# Patient Record
Sex: MALE | Race: WHITE
[De-identification: names, ages, dates, MRNs, and addresses within clinical notes are randomized per-mention and may not be internally consistent; named-entity substitution may affect disease eponyms.]

---

## 2019-09-24 ENCOUNTER — HOSPITAL ENCOUNTER (EMERGENCY)
Dept: HOSPITAL 25 - UCCORT | Age: 64
Discharge: HOME | End: 2019-09-24
Payer: COMMERCIAL

## 2019-09-24 VITALS — DIASTOLIC BLOOD PRESSURE: 62 MMHG | SYSTOLIC BLOOD PRESSURE: 149 MMHG

## 2019-09-24 DIAGNOSIS — Z79.84: ICD-10-CM

## 2019-09-24 DIAGNOSIS — H65.92: Primary | ICD-10-CM

## 2019-09-24 DIAGNOSIS — N40.0: ICD-10-CM

## 2019-09-24 DIAGNOSIS — J02.9: ICD-10-CM

## 2019-09-24 DIAGNOSIS — Z87.891: ICD-10-CM

## 2019-09-24 DIAGNOSIS — I10: ICD-10-CM

## 2019-09-24 DIAGNOSIS — E78.5: ICD-10-CM

## 2019-09-24 DIAGNOSIS — E11.9: ICD-10-CM

## 2019-09-24 PROCEDURE — 99202 OFFICE O/P NEW SF 15 MIN: CPT

## 2019-09-24 PROCEDURE — G0463 HOSPITAL OUTPT CLINIC VISIT: HCPCS

## 2019-09-24 NOTE — UC
Throat Pain/Nasal Arthur HPI





- HPI Summary


HPI Summary: 


64-year-old male comes in with a chief complaint of left-sided throat pain and 

left ear pain.  Patient reports she's had weeks of upper respiratory tract 

infection symptoms and was treated with amoxicillin and got rid of most of the 

symptoms except for the left ear pain in the left throat pain.  Is grossly been 

increasing since the treatment with amoxicillin which is finished over a week 

ago.  No difficulty breathing or swallowing.  Patient does have dentures in and 

he did remove them for about a day or 2 and did not feel there was any 

improvement.





- History of Current Complaint


Chief Complaint: UCRespiratory


Stated Complaint: ST, EAR PAIN


Time Seen by Provider: 09/24/19 16:36


Pain Intensity: 2





- Allergies/Home Medications


Allergies/Adverse Reactions: 


 Allergies











Allergy/AdvReac Type Severity Reaction Status Date / Time


 


No Known Allergies Allergy   Verified 09/24/19 16:37











Home Medications: 


 Home Medications





Aspirin EC TAB* [Ecotrin EC Low Dose 81 MG*] 1 tab DAILY 09/24/19 [History 

Confirmed 09/24/19]


Cholecalciferol (Vitamin D3) [Vitamin D3] 1 tab DAILY 09/24/19 [History 

Confirmed 09/24/19]


Finasteride TAB* [Proscar TAB*] 1 tab DAILY 09/24/19 [History Confirmed 09/24/19

]


Glucosamine Sulfate Dipot Chlr [Glucosamine] 1,500 mg QAM 09/24/19 [History 

Confirmed 09/24/19]


Lisinopril TAB* [Prinivil TAB 10 MG*] 80 mg QAM 09/24/19 [History Confirmed 09/ 24/19]


Pantoprazole TAB * [Protonix TAB*] 1 tab DAILY 09/24/19 [History Confirmed 09/24 /19]


Potassium Citrate [Urocit-K] 30 mg BID 09/24/19 [History Confirmed 09/24/19]


Simvastatin [Zocor] 20 mg QPM 09/24/19 [History Confirmed 09/24/19]


Tamsulosin CAP* [Flomax CAP*] 1 tab DAILY 09/24/19 [History Confirmed 09/24/19]


glipiZIDE TAB* [Glucotrol TAB*] 10 mg BID 09/24/19 [History Confirmed 09/24/19]


metFORMIN* [Glucophage 1000 MG TAB *] 1,000 mg PO BID 09/24/19 [History 

Confirmed 09/24/19]











PMH/Surg Hx/FS Hx/Imm Hx


Previously Healthy: Yes - BPH


Endocrine History: Diabetes, Dyslipidemia


Cardiovascular History: Hypertension


GI/ History: Gastroesophageal Reflux





- Surgical History


Surgical History: Yes


Surgery Procedure, Year, and Place: Left kidney stones/stent.  hernia





- Family History


Known Family History: Positive: Non-Contributory





- Social History


Alcohol Use: None


Substance Use Type: None


Smoking Status (MU): Former Smoker


Length of Time of Smoking/Using Tobacco: 2-3 PPD X30-40 YEARS


When Did the Patient Quit Smoking/Using Tobacco: 10 YEARS AGO





Review of Systems


All Other Systems Reviewed And Are Negative: Yes


Constitutional: Positive: Negative


Skin: Positive: Negative


Eyes: Positive: Negative


ENT: Positive: Sore Throat, Ear Ache


Respiratory: Positive: Negative


Cardiovascular: Positive: Negative


Gastrointestinal: Positive: Negative


Motor: Positive: Negative


Neurovascular: Positive: Negative


Musculoskeletal: Positive: Negative


Neurological: Positive: Negative


Psychological: Positive: Negative


Is Patient Immunocompromised?: No





Physical Exam


Triage Information Reviewed: Yes


Appearance: Well-Appearing, No Pain Distress, Well-Nourished


Vital Signs: 


 Initial Vital Signs











Temp  97.7 F   09/24/19 16:38


 


Pulse  76   09/24/19 16:38


 


Resp  18   09/24/19 16:38


 


BP  149/62   09/24/19 16:38


 


Pulse Ox  98   09/24/19 16:38











Vital Signs Reviewed: Yes


Eye Exam: Normal


Eyes: Positive: Conjunctiva Clear


ENT: Positive: Pharyngeal erythema, TM dull - LEFT, Uvula midline, Other - No 

peritonsillar abscess seen on exam.  No obvious mass palpable on external exam 

of the neck.  Oropharynx open..  Negative: Muffled voice, Hoarse voice


Neck: Positive: Supple


Respiratory: Positive: Lungs clear, Normal breath sounds, No respiratory 

distress


Cardiovascular: Positive: RRR


Musculoskeletal: Positive: Strength Intact, ROM Intact


Neurological: Positive: Alert, Muscle Tone Normal


Psychological: Positive: Age Appropriate Behavior


Skin Exam: Normal





Throat Pain/Nasal Course/Dx





- Course


Course Of Treatment: 


Patient reports he was to follow-up with Dr. Hanson ENT for this condition but 

he was not able to.  At this time going treat with Augmentin and have him follow

-up with Dr. Hanson if not completely improved.  If the patient gets worse with 

difficult breathing or swallowing or becomes ill he needs to the emergency 

department for the evaluation and care.





- Differential Dx/Diagnosis


Provider Diagnosis: 


 Serous otitis media, Pharyngitis








Discharge ED





- Sign-Out/Discharge


Documenting (check all that apply): Patient Departure


All imaging exams completed and their final reports reviewed: No Studies





- Discharge Plan


Condition: Stable


Disposition: HOME


Prescriptions: 


Amoxicillin/Clavulanate TAB* [Augmentin *] 875 mg PO BID #20 tab


Patient Education Materials:  Pharyngitis (ED), Serous Otitis Media (ED)


Referrals: 


Maulik Hanson MD [Medical Doctor] - 


Josefina Frankel [Primary Care Provider] - 


Additional Instructions: 


FOLLOW UP WITH ENT, DR HANSON, IF NOT COMPLETELY IMPROVED.


GO TO THE EMERGENCY DEPARTMENT IF WORSE; DIFFICULTY SWALLOWING OR BREATHING, 

YOU FEEL ILL OR ANY QUESTIONS OR CONCERNS.








- Billing Disposition and Condition


Condition: STABLE


Disposition: Home